# Patient Record
Sex: MALE | Race: WHITE | URBAN - METROPOLITAN AREA
[De-identification: names, ages, dates, MRNs, and addresses within clinical notes are randomized per-mention and may not be internally consistent; named-entity substitution may affect disease eponyms.]

---

## 2023-05-08 ENCOUNTER — OFFICE VISIT (OUTPATIENT)
Facility: LOCATION | Age: 68
End: 2023-05-08
Payer: MEDICARE

## 2023-05-08 DIAGNOSIS — H04.123 DRY EYE SYNDROME OF BILATERAL LACRIMAL GLANDS: ICD-10-CM

## 2023-05-08 DIAGNOSIS — H52.13 MYOPIA, BILATERAL: ICD-10-CM

## 2023-05-08 DIAGNOSIS — H25.13 AGE-RELATED NUCLEAR CATARACT, BILATERAL: Primary | ICD-10-CM

## 2023-05-08 DIAGNOSIS — H43.813 VITREOUS DEGENERATION, BILATERAL: ICD-10-CM

## 2023-05-08 DIAGNOSIS — H16.223 KERATOCONJUNCT SICCA, NOT SPECIFIED AS SJOGREN'S, BILATERAL: ICD-10-CM

## 2023-05-08 DIAGNOSIS — E11.9 TYPE 2 DIABETES MELLITUS WITHOUT COMPLICATIONS: ICD-10-CM

## 2023-05-08 PROCEDURE — 92025 CPTRIZED CORNEAL TOPOGRAPHY: CPT | Performed by: OPHTHALMOLOGY

## 2023-05-08 PROCEDURE — 92136 OPHTHALMIC BIOMETRY: CPT | Performed by: OPHTHALMOLOGY

## 2023-05-08 PROCEDURE — 99204 OFFICE O/P NEW MOD 45 MIN: CPT | Performed by: OPHTHALMOLOGY

## 2023-05-08 PROCEDURE — 92134 CPTRZ OPH DX IMG PST SGM RTA: CPT | Performed by: OPHTHALMOLOGY

## 2023-05-08 PROCEDURE — 92015 DETERMINE REFRACTIVE STATE: CPT | Performed by: OPHTHALMOLOGY

## 2023-05-08 ASSESSMENT — INTRAOCULAR PRESSURE
OD: 22
OS: 20

## 2023-05-08 ASSESSMENT — VISUAL ACUITY
OS: 20/20
OD: 20/20

## 2023-05-08 NOTE — IMPRESSION/PLAN
Impression: Age-related nuclear cataract, bilateral: H25.13. Plan: Referred patient to retina for evaluation and clearance for cataract surgery. Repeat [address] and [address] [facility] next visit. Considering Vivity lens vs Synergy lens.  

RTC 4 weeks to repeat [address], re-evaluate with Dr. Margo Lorenzana

## 2023-05-08 NOTE — IMPRESSION/PLAN
Impression: Dry eye syndrome of bilateral lacrimal glands: H04.123.  Plan: Start Refresh Plus QID OU

RTC 4 weeks for extended plugs x 4 with Dr. Cipriano Luna

## 2023-05-08 NOTE — IMPRESSION/PLAN
Impression: Vitreous degeneration, bilateral: H43.813. PVD OU Plan: Discussed pathophysiology of PVD, discussed S/S of RD, patient to follow up ASAP with increasing flashes, floaters, curtain or shadow. Referred patient to retina for evaluation and clearance for cataract surgery.

## 2023-05-08 NOTE — IMPRESSION/PLAN
Impression: Keratoconjunct sicca, not specified as Sjogren's, bilateral: J03.970. Plan: see dry eye plan.

## 2023-05-08 NOTE — IMPRESSION/PLAN
Impression: Type 2 diabetes mellitus without complications: T26.2. Plan: Patient has type II diabetes with no complications. Discussed with patient the importance of controlling Blood Sugar and A1c levels. Patient advised to continue care with primary care physician. Patient Education        Depression Treatment: Care Instructions  Your Care Instructions     Depression is a condition that affects the way you feel, think, and act. It causes symptoms such as low energy, loss of interest in daily activities, and sadness or grouchiness that goes on for a long time. Depression is very common and affects men and women of all ages. Depression is a medical illness caused by changes in the natural chemicals in your brain. It is not a character flaw, and it does not mean that you are a bad or weak person. It does not mean that you are going crazy. It is important to know that depression can be treated. Medicines, counseling, and self-care can all help. Many people do not get help because they are embarrassed or think that they will get over the depression on their own. But some people do not get better without treatment. Follow-up care is a key part of your treatment and safety. Be sure to make and go to all appointments, and call your doctor if you are having problems. It's also a good idea to know your test results and keep a list of the medicines you take. How can you care for yourself at home? Learn about antidepressant medicines  Antidepressant medicines can improve or end the symptoms of depression. You may need to take the medicine for at least 6 months, and often longer. Keep taking your medicine even if you feel better. If you stop taking it too soon, your symptoms may come back or get worse. You may start to feel better within 1 to 3 weeks of taking antidepressant medicine. But it can take as many as 6 to 8 weeks to see more improvement. Talk to your doctor if you have problems with your medicine or if you do not notice any improvement after 3 weeks. Antidepressants can make you feel tired, dizzy, or nervous. Some people have dry mouth, constipation, headaches, sexual problems, an upset stomach, or diarrhea.  Many of these side effects are mild and go away on their own or liability for your use of this information.

## 2023-06-05 ENCOUNTER — OFFICE VISIT (OUTPATIENT)
Facility: LOCATION | Age: 68
End: 2023-06-05
Payer: MEDICARE

## 2023-06-05 DIAGNOSIS — H16.223 KERATOCONJUNCT SICCA, NOT SPECIFIED AS SJOGREN'S, BILATERAL: ICD-10-CM

## 2023-06-05 DIAGNOSIS — H25.13 AGE-RELATED NUCLEAR CATARACT, BILATERAL: ICD-10-CM

## 2023-06-05 DIAGNOSIS — H04.123 DRY EYE SYNDROME OF BILATERAL LACRIMAL GLANDS: Primary | ICD-10-CM

## 2023-06-05 PROCEDURE — 92025 CPTRIZED CORNEAL TOPOGRAPHY: CPT | Performed by: OPHTHALMOLOGY

## 2023-06-05 ASSESSMENT — INTRAOCULAR PRESSURE
OD: 22
OS: 18

## 2023-06-05 NOTE — IMPRESSION/PLAN
Impression: Dry eye syndrome of bilateral lacrimal glands: H04.123. Placed extended plugs x 4 today. Patient tolerated procedure well.    Plan: Continue  Refresh Plus QID OU

## 2023-06-05 NOTE — IMPRESSION/PLAN
Impression: Age-related nuclear cataract, bilateral: H25.13. Plan: Discussed R/B/A of cataract surgery including risk of bleeding, infection, decreased BCVA, loss of eye. No guarantees, possible need  for further surgery. Patient expressed good understanding and wishes to proceed with CE/IOL OD then OS Explained to patient that cataract surgery is a procedure performed on the eye and any procedure of the eye is known to cause or aggravate ocular surface disease and dry eye issues. Explained to patient that this can be a lifelong concern even without cataract surgery and the cost of this is not covered by the cost of cataract surgery. Patient expresses understanding. Patient is leaning towards Standard lens OU [address] 

RTC 1 week pre op

## 2023-06-05 NOTE — IMPRESSION/PLAN
Impression: Keratoconjunct sicca, not specified as Sjogren's, bilateral: M87.346. Plan: see dry eye plan.

## 2023-06-16 ENCOUNTER — POST-OPERATIVE VISIT (OUTPATIENT)
Facility: LOCATION | Age: 68
End: 2023-06-16
Payer: MEDICARE

## 2023-06-16 DIAGNOSIS — Z48.810 ENCOUNTER FOR SURGICAL AFTERCARE FOLLOWING SURGERY ON A SENSE ORGAN: Primary | ICD-10-CM

## 2023-06-16 PROCEDURE — 99024 POSTOP FOLLOW-UP VISIT: CPT | Performed by: OPHTHALMOLOGY

## 2023-06-16 ASSESSMENT — INTRAOCULAR PRESSURE: OD: 23

## 2023-06-16 NOTE — IMPRESSION/PLAN
Impression: S/P Cataract extraction by phacoemulsification with IOL placement OD - . Encounter for surgical aftercare following surgery on a sense organ  Z48.810. Plan: Patient to continue antibiotic, steroid, and NSAID as instructed. Increase PFATs Q2H OU. RTC 1 week - check VA, Ks, MR, IOP and re-evaluation of operated eye. Obtain MAC OCT OU if VA is 20/30 or worse. Patient reminded of post-operative restrictions (do not rub eye, do not bend over, do not  more than 10-15 lbs, do not sleep on side of surgery, do not get water, dust or dirt in eye, wear plastic shield while sleeping). RTC PRN decrease VA, increase pain, redness, discharge, photophobia, flashes/floaters, or other vision problems.

## 2023-06-19 ENCOUNTER — POST-OPERATIVE VISIT (OUTPATIENT)
Facility: LOCATION | Age: 68
End: 2023-06-19
Payer: MEDICARE

## 2023-06-19 DIAGNOSIS — H25.12 AGE-RELATED NUCLEAR CATARACT, LEFT EYE: ICD-10-CM

## 2023-06-19 DIAGNOSIS — H52.13 MYOPIA, BILATERAL: ICD-10-CM

## 2023-06-19 DIAGNOSIS — Z48.810 ENCOUNTER FOR SURGICAL AFTERCARE FOLLOWING SURGERY ON A SENSE ORGAN: Primary | ICD-10-CM

## 2023-06-19 PROCEDURE — 99024 POSTOP FOLLOW-UP VISIT: CPT | Performed by: OPHTHALMOLOGY

## 2023-06-19 PROCEDURE — 92015 DETERMINE REFRACTIVE STATE: CPT | Performed by: OPHTHALMOLOGY

## 2023-06-19 ASSESSMENT — INTRAOCULAR PRESSURE
OD: 22
OS: 20

## 2023-06-19 ASSESSMENT — VISUAL ACUITY: OD: 20/20

## 2023-06-19 NOTE — IMPRESSION/PLAN
Impression: Age-related nuclear cataract, left eye: H25.12. Plan: Discussed R/B/A of cataract surgery including risk of bleeding, infection, decreased BCVA, loss of eye. No guarantees, possible need  for further surgery. Patient expressed good understanding and wishes to proceed with CE/IOL OS. Patient is leaning towards standard cataract surgery. Discussed with patient he may need glasses after cataract surgery. No guarantees were given. Patient expressed understanding. [address] Goal: [facility] RTC for surgery.

## 2023-06-19 NOTE — IMPRESSION/PLAN
Impression: Encounter for surgical aftercare following surgery on a sense organ: Z48.810. Plan: Stop Ketorolac. Continue Ofloxacin QID OD. Patient to stop on Friday 06/23/2023. Start Prolensa QD OD. Start tapering Prednisolone TID x 1 week then BID x 1 week then QD x 1 week then stop. Continue PFATs Q2H OU. RTC 1 month - check VA, Ks, MR, IOP and re-evaluation of operated eye. All restrictions discontinued. Patient able to resume all normal activities. Pt to see Dr. Keiry Ellis 1-2 weeks after for continued post-operative care and annual eye health and vision exams. RTC PRN decrease VA, increase pain, redness, discharge, photophobia, flashes/floaters, or other vision problems.

## 2023-06-24 ENCOUNTER — POST-OPERATIVE VISIT (OUTPATIENT)
Facility: LOCATION | Age: 68
End: 2023-06-24
Payer: MEDICARE

## 2023-06-24 DIAGNOSIS — Z96.1 PRESENCE OF INTRAOCULAR LENS: Primary | ICD-10-CM

## 2023-06-24 PROCEDURE — 99024 POSTOP FOLLOW-UP VISIT: CPT | Performed by: OPHTHALMOLOGY

## 2023-06-24 RX ORDER — BROMFENAC SODIUM 0.7 MG/ML
0.07 % SOLUTION/ DROPS OPHTHALMIC
Qty: 3 | Refills: 3 | Status: ACTIVE
Start: 2023-06-24

## 2023-06-24 ASSESSMENT — INTRAOCULAR PRESSURE
OD: 21
OS: 22

## 2023-06-28 ENCOUNTER — POST-OPERATIVE VISIT (OUTPATIENT)
Facility: LOCATION | Age: 68
End: 2023-06-28
Payer: MEDICARE

## 2023-06-28 DIAGNOSIS — Z96.1 PRESENCE OF INTRAOCULAR LENS: Primary | ICD-10-CM

## 2023-06-28 PROCEDURE — 92015 DETERMINE REFRACTIVE STATE: CPT | Performed by: OPHTHALMOLOGY

## 2023-06-28 PROCEDURE — 99024 POSTOP FOLLOW-UP VISIT: CPT | Performed by: OPHTHALMOLOGY

## 2023-06-28 ASSESSMENT — VISUAL ACUITY
OS: 20/20
OD: 20/20

## 2023-06-28 ASSESSMENT — INTRAOCULAR PRESSURE
OD: 20
OS: 18

## 2023-06-28 NOTE — IMPRESSION/PLAN
Impression: S/P Cataract Extraction by phacoemulsification with IOL placement OS - 5 Days. Presence of intraocular lens  Z96.1. Plan: Stop Ofloxacin. Continue Prolensa QD OS. Start tapering Prednisolone TID x 1 week then BID x 1 week then QD x 1 week then stop. Continue PFATs Q2H OU. RTC 1 month - check VA, Ks, MR, IOP and re-evaluation of operated eye. All restrictions discontinued. Patient able to resume all normal activities. Pt to see Dr. Ron Fisher 1-2 weeks after for continued post-operative care and annual eye health and vision exams. RTC PRN decrease VA, increase pain, redness, discharge, photophobia, flashes/floaters, or other vision problems. Needs to make an appointment

## 2023-07-17 ENCOUNTER — POST-OPERATIVE VISIT (OUTPATIENT)
Facility: LOCATION | Age: 68
End: 2023-07-17
Payer: MEDICARE

## 2023-07-17 DIAGNOSIS — Z96.1 PRESENCE OF INTRAOCULAR LENS: ICD-10-CM

## 2023-07-17 DIAGNOSIS — H52.13 MYOPIA, BILATERAL: Primary | ICD-10-CM

## 2023-07-17 PROCEDURE — 99024 POSTOP FOLLOW-UP VISIT: CPT | Performed by: OPHTHALMOLOGY

## 2023-07-17 ASSESSMENT — INTRAOCULAR PRESSURE
OS: 16
OD: 17

## 2023-07-17 ASSESSMENT — VISUAL ACUITY
OD: 20/25
OS: 20/20

## 2023-07-17 NOTE — IMPRESSION/PLAN
Impression: S/P Cataract Extraction by phacoemulsification with IOL placement OS - 24 Days. Presence of intraocular lens  Z96.1. Plan: Start Refresh Plus QID OU Patient is released to follow up with [facility], [email or web address].  for primary eye care and postoperative care. 

RTC 3 months for [facility] OU with Dr. Kathy Snow

## 2023-10-09 ENCOUNTER — OFFICE VISIT (OUTPATIENT)
Facility: LOCATION | Age: 68
End: 2023-10-09
Payer: MEDICARE

## 2023-10-09 DIAGNOSIS — H16.223 KERATOCONJUNCT SICCA, NOT SPECIFIED AS SJOGREN'S, BILATERAL: ICD-10-CM

## 2023-10-09 DIAGNOSIS — H04.123 DRY EYE SYNDROME OF BILATERAL LACRIMAL GLANDS: Primary | ICD-10-CM

## 2023-10-09 PROCEDURE — 99213 OFFICE O/P EST LOW 20 MIN: CPT | Performed by: OPHTHALMOLOGY

## 2023-10-09 ASSESSMENT — INTRAOCULAR PRESSURE
OS: 15
OD: 16